# Patient Record
Sex: FEMALE | Race: WHITE | NOT HISPANIC OR LATINO | URBAN - METROPOLITAN AREA
[De-identification: names, ages, dates, MRNs, and addresses within clinical notes are randomized per-mention and may not be internally consistent; named-entity substitution may affect disease eponyms.]

---

## 2023-09-12 ENCOUNTER — HOSPITAL ENCOUNTER (OUTPATIENT)
Dept: DATA CONVERSION | Facility: HOSPITAL | Age: 33
Discharge: HOME | End: 2023-09-12
Payer: COMMERCIAL

## 2023-09-12 DIAGNOSIS — I10 ESSENTIAL (PRIMARY) HYPERTENSION: ICD-10-CM

## 2023-09-12 DIAGNOSIS — Z01.818 ENCOUNTER FOR OTHER PREPROCEDURAL EXAMINATION: ICD-10-CM

## 2023-09-12 LAB
ALBUMIN SERPL-MCNC: 4.3 GM/DL (ref 3.5–5)
ALBUMIN/GLOB SERPL: 1.2 RATIO (ref 1.5–3)
ALP BLD-CCNC: 62 U/L (ref 35–125)
ALT SERPL-CCNC: 29 U/L (ref 5–40)
ANION GAP SERPL CALCULATED.3IONS-SCNC: 14 MMOL/L (ref 0–19)
AST SERPL-CCNC: 24 U/L (ref 5–40)
BASOPHILS # BLD AUTO: 0.06 K/UL (ref 0–0.22)
BASOPHILS NFR BLD AUTO: 0.8 % (ref 0–1)
BILIRUB SERPL-MCNC: 0.2 MG/DL (ref 0.1–1.2)
BUN SERPL-MCNC: 13 MG/DL (ref 8–25)
BUN/CREAT SERPL: 16.3 RATIO (ref 8–21)
CALCIUM SERPL-MCNC: 9.2 MG/DL (ref 8.5–10.4)
CHLORIDE SERPL-SCNC: 99 MMOL/L (ref 97–107)
CO2 SERPL-SCNC: 26 MMOL/L (ref 24–31)
CREAT SERPL-MCNC: 0.8 MG/DL (ref 0.4–1.6)
DEPRECATED RDW RBC AUTO: 42.8 FL (ref 37–54)
DIFFERENTIAL METHOD BLD: NORMAL
EOSINOPHIL # BLD AUTO: 0.16 K/UL (ref 0–0.45)
EOSINOPHIL NFR BLD: 2.1 % (ref 0–3)
ERYTHROCYTE [DISTWIDTH] IN BLOOD BY AUTOMATED COUNT: 12.4 % (ref 11.7–15)
GFR SERPL CREATININE-BSD FRML MDRD: 100 ML/MIN/1.73 M2
GLOBULIN SER-MCNC: 3.6 G/DL (ref 1.9–3.7)
GLUCOSE SERPL-MCNC: 90 MG/DL (ref 65–99)
HCT VFR BLD AUTO: 41.2 % (ref 36–44)
HGB BLD-MCNC: 13.8 GM/DL (ref 12–15)
IMM GRANULOCYTES # BLD AUTO: 0.02 K/UL (ref 0–0.1)
LYMPHOCYTES # BLD AUTO: 2.63 K/UL (ref 1.2–3.2)
LYMPHOCYTES NFR BLD MANUAL: 34.1 % (ref 20–40)
MCH RBC QN AUTO: 31.3 PG (ref 26–34)
MCHC RBC AUTO-ENTMCNC: 33.5 % (ref 31–37)
MCV RBC AUTO: 93.4 FL (ref 80–100)
MONOCYTES # BLD AUTO: 0.53 K/UL (ref 0–0.8)
MONOCYTES NFR BLD MANUAL: 6.9 % (ref 0–8)
NEUTROPHILS # BLD AUTO: 4.31 K/UL
NEUTROPHILS # BLD AUTO: 4.31 K/UL (ref 1.8–7.7)
NEUTROPHILS.IMMATURE NFR BLD: 0.3 % (ref 0–1)
NEUTS SEG NFR BLD: 55.8 % (ref 50–70)
NRBC BLD-RTO: 0 /100 WBC
PLATELET # BLD AUTO: 289 K/UL (ref 150–450)
PMV BLD AUTO: 8.9 CU (ref 7–12.6)
POTASSIUM SERPL-SCNC: 3.8 MMOL/L (ref 3.4–5.1)
PROT SERPL-MCNC: 7.9 G/DL (ref 5.9–7.9)
RBC # BLD AUTO: 4.41 M/UL (ref 4–4.9)
SODIUM SERPL-SCNC: 139 MMOL/L (ref 133–145)
WBC # BLD AUTO: 7.7 K/UL (ref 4.5–11)

## 2023-09-14 PROBLEM — N80.9 ENDOMETRIOSIS: Status: ACTIVE | Noted: 2023-09-14

## 2023-09-14 PROBLEM — I10 ACCELERATED ESSENTIAL HYPERTENSION: Status: ACTIVE | Noted: 2023-09-14

## 2023-09-14 PROBLEM — M92.529 JUVENILE OSTEOCHONDROSIS OF TIBIAL TUBEROSITY: Status: ACTIVE | Noted: 2023-09-14

## 2023-09-14 PROBLEM — R25.2 TRISMUS: Status: ACTIVE | Noted: 2023-09-14

## 2023-09-14 PROBLEM — F41.8 DEPRESSION WITH ANXIETY: Status: ACTIVE | Noted: 2023-09-14

## 2023-09-14 PROBLEM — G47.33 OBSTRUCTIVE SLEEP APNEA, ADULT: Status: ACTIVE | Noted: 2023-09-14

## 2023-09-14 PROBLEM — I10 HTN (HYPERTENSION): Status: ACTIVE | Noted: 2023-09-14

## 2023-09-14 PROBLEM — F41.0 PANIC ATTACKS: Status: ACTIVE | Noted: 2023-09-14

## 2023-09-14 PROBLEM — R00.2 HEART PALPITATIONS: Status: ACTIVE | Noted: 2023-09-14

## 2023-09-14 PROBLEM — N92.1 MENOMETRORRHAGIA: Status: ACTIVE | Noted: 2023-09-14

## 2023-09-14 PROBLEM — R35.0 URINARY FREQUENCY: Status: ACTIVE | Noted: 2023-09-14

## 2023-09-14 PROBLEM — R10.11 ABDOMINAL PAIN, RUQ (RIGHT UPPER QUADRANT): Status: ACTIVE | Noted: 2023-09-14

## 2023-09-14 PROBLEM — J34.2 DEVIATED NASAL SEPTUM: Status: ACTIVE | Noted: 2023-09-14

## 2023-09-14 PROBLEM — F17.200 SMOKING ADDICTION: Status: ACTIVE | Noted: 2023-09-14

## 2023-09-14 PROBLEM — N93.9 ABNORMAL VAGINAL BLEEDING: Status: ACTIVE | Noted: 2023-09-14

## 2023-09-14 PROBLEM — K21.9 GASTRO-ESOPHAGEAL REFLUX DISEASE WITHOUT ESOPHAGITIS: Status: ACTIVE | Noted: 2023-09-14

## 2023-09-14 PROBLEM — N12 PYELONEPHRITIS: Status: ACTIVE | Noted: 2023-09-14

## 2023-09-14 PROBLEM — K52.9 COLITIS: Status: ACTIVE | Noted: 2023-09-14

## 2023-09-14 PROBLEM — R40.0 DAYTIME SOMNOLENCE: Status: ACTIVE | Noted: 2023-09-14

## 2023-09-14 PROBLEM — E78.5 HYPERLIPIDEMIA: Status: ACTIVE | Noted: 2023-09-14

## 2023-09-14 PROBLEM — E66.01 MORBID OBESITY (MULTI): Status: ACTIVE | Noted: 2023-09-14

## 2023-09-14 PROBLEM — K81.9 ACALCULOUS CHOLECYSTITIS: Status: ACTIVE | Noted: 2023-09-14

## 2023-09-14 PROBLEM — E55.9 VITAMIN D DEFICIENCY: Status: ACTIVE | Noted: 2023-09-14

## 2023-09-14 RX ORDER — ASPIRIN 325 MG
50000 TABLET, DELAYED RELEASE (ENTERIC COATED) ORAL
COMMUNITY
Start: 2023-01-23

## 2023-09-14 RX ORDER — FLUTICASONE PROPIONATE 50 MCG
SPRAY, SUSPENSION (ML) NASAL
COMMUNITY

## 2023-09-14 RX ORDER — LISDEXAMFETAMINE DIMESYLATE 40 MG/1
CAPSULE ORAL
COMMUNITY
Start: 2023-08-30

## 2023-09-14 RX ORDER — FAMOTIDINE 20 MG/1
20 TABLET, FILM COATED ORAL 2 TIMES DAILY
COMMUNITY
Start: 2023-03-02

## 2023-09-14 RX ORDER — ARIPIPRAZOLE 2 MG/1
2 TABLET ORAL DAILY
COMMUNITY
End: 2023-11-16 | Stop reason: SDUPTHER

## 2023-09-14 RX ORDER — LISDEXAMFETAMINE DIMESYLATE 20 MG/1
CAPSULE ORAL
COMMUNITY
Start: 2023-04-14

## 2023-09-14 RX ORDER — ACETAMINOPHEN 325 MG/1
325 TABLET ORAL EVERY 4 HOURS
COMMUNITY

## 2023-09-14 RX ORDER — ONDANSETRON 4 MG/1
4 TABLET, ORALLY DISINTEGRATING ORAL EVERY 6 HOURS PRN
COMMUNITY
Start: 2023-03-02

## 2023-09-14 RX ORDER — BUPROPION HYDROCHLORIDE 300 MG/1
300 TABLET ORAL EVERY MORNING
COMMUNITY

## 2023-09-14 RX ORDER — CALCIUM CARB/MAGNESIUM CARB 311-232MG
1 TABLET ORAL DAILY
COMMUNITY

## 2023-09-14 RX ORDER — BUPROPION HYDROCHLORIDE 150 MG/1
450 TABLET ORAL EVERY MORNING
COMMUNITY

## 2023-09-14 RX ORDER — DICYCLOMINE HYDROCHLORIDE 20 MG/1
20 TABLET ORAL EVERY 6 HOURS PRN
COMMUNITY
Start: 2023-03-02

## 2023-09-14 RX ORDER — MULTIVITAMIN
TABLET ORAL
COMMUNITY

## 2023-09-14 RX ORDER — ESCITALOPRAM OXALATE 20 MG/1
20 TABLET ORAL DAILY
COMMUNITY
End: 2024-01-10

## 2023-09-14 RX ORDER — AMOXICILLIN 500 MG/1
500 CAPSULE ORAL EVERY 12 HOURS
COMMUNITY
Start: 2019-06-27

## 2023-09-14 RX ORDER — LEVONORGESTREL/ETHIN.ESTRADIOL 0.1-0.02MG
1 TABLET ORAL DAILY
COMMUNITY
Start: 2021-04-14

## 2023-10-09 ENCOUNTER — OFFICE VISIT (OUTPATIENT)
Dept: SURGERY | Facility: CLINIC | Age: 33
End: 2023-10-09
Payer: COMMERCIAL

## 2023-10-09 ENCOUNTER — NUTRITION (OUTPATIENT)
Dept: SURGERY | Facility: CLINIC | Age: 33
End: 2023-10-09

## 2023-10-09 VITALS
HEIGHT: 67 IN | HEART RATE: 101 BPM | BODY MASS INDEX: 45.99 KG/M2 | DIASTOLIC BLOOD PRESSURE: 99 MMHG | SYSTOLIC BLOOD PRESSURE: 153 MMHG | WEIGHT: 293 LBS

## 2023-10-09 DIAGNOSIS — Z09 SURGERY FOLLOW-UP EXAMINATION: Primary | ICD-10-CM

## 2023-10-09 PROCEDURE — 99024 POSTOP FOLLOW-UP VISIT: CPT

## 2023-10-09 PROCEDURE — 3077F SYST BP >= 140 MM HG: CPT

## 2023-10-09 PROCEDURE — 1036F TOBACCO NON-USER: CPT

## 2023-10-09 PROCEDURE — 3080F DIAST BP >= 90 MM HG: CPT

## 2023-10-09 ASSESSMENT — LIFESTYLE VARIABLES
HOW OFTEN DO YOU HAVE A DRINK CONTAINING ALCOHOL: NEVER
HOW OFTEN DO YOU HAVE SIX OR MORE DRINKS ON ONE OCCASION: NEVER
SKIP TO QUESTIONS 9-10: 1
AUDIT-C TOTAL SCORE: 0
HOW MANY STANDARD DRINKS CONTAINING ALCOHOL DO YOU HAVE ON A TYPICAL DAY: PATIENT DOES NOT DRINK

## 2023-10-09 ASSESSMENT — PATIENT HEALTH QUESTIONNAIRE - PHQ9
1. LITTLE INTEREST OR PLEASURE IN DOING THINGS: NOT AT ALL
SUM OF ALL RESPONSES TO PHQ9 QUESTIONS 1 AND 2: 0
2. FEELING DOWN, DEPRESSED OR HOPELESS: NOT AT ALL

## 2023-10-09 ASSESSMENT — ENCOUNTER SYMPTOMS
LOSS OF SENSATION IN FEET: 0
OCCASIONAL FEELINGS OF UNSTEADINESS: 0
DEPRESSION: 0

## 2023-10-09 ASSESSMENT — COLUMBIA-SUICIDE SEVERITY RATING SCALE - C-SSRS
2. HAVE YOU ACTUALLY HAD ANY THOUGHTS OF KILLING YOURSELF?: NO
1. IN THE PAST MONTH, HAVE YOU WISHED YOU WERE DEAD OR WISHED YOU COULD GO TO SLEEP AND NOT WAKE UP?: NO
6. HAVE YOU EVER DONE ANYTHING, STARTED TO DO ANYTHING, OR PREPARED TO DO ANYTHING TO END YOUR LIFE?: NO

## 2023-10-09 ASSESSMENT — PAIN SCALES - GENERAL: PAINLEVEL: 0-NO PAIN

## 2023-10-09 NOTE — PROGRESS NOTES
2 week post-op Dietary Appointment :     Reviewed pureed diet progression. Patient was instructed to start purees on Tuesday 10/10. I reviewed how to blend foods appropriately to one consistency using a . Pureed food examples and recipes were provided in packet, reviewed foods to avoid at this time. Informed patient to measure out portion sizes and track volume size. Informed patient to aim for at least 60g of protein per day and to add protein shake as needed. Encouraged continuing to prioritize fluids and ensure getting in at least 50-60 oz fluid daily. Reviewed the importance of slowly eating and stop when starting to feel full. Patient was instructed to follow pureed diet for a full 2 weeks, and to not progress diet until instructed to do so. Pureed education packet was given, patient shows good understanding.     Simi Mackay, MS, RD, LD

## 2023-10-09 NOTE — PROGRESS NOTES
"Subjective   Patient ID: Alethea Gannon is a 33 y.o. female who presents for Post-op (2 wk fuv rygb).    Alethea is here for her 2 week follow up. She is drinking 50-60oz of fluid per day. She is getting 50-60g of protein per day. She is walking every hour. She is currently taking celebrate 18 MVI, omeprazole, and lovenox. She has 2 weeks of lovenox left. She does not have a gallbladder.    Visit Vitals  BP (!) 153/99 (BP Location: Left arm, Patient Position: Sitting)   Pulse 101   Ht 1.702 m (5' 7\")   Wt 140 kg (309 lb) Comment: START WT:       IDEAL WT:         START EXCESS:           TOTAL WT LOSS:           EWL:              %   BMI 48.40 kg/m²   Smoking Status Never   BSA 2.57 m²    Body mass index is 48.4 kg/m². -21 pounds    START WT:   342    IDEAL WT: 160    START EXCESS: 182       TOTAL WT LOSS:  33       EWL: 18 %         Objective   Physical Exam  Constitutional:       Appearance: Normal appearance.   Eyes:      Pupils: Pupils are equal, round, and reactive to light.   Cardiovascular:      Rate and Rhythm: Normal rate.   Pulmonary:      Effort: Pulmonary effort is normal.   Abdominal:      Palpations: Abdomen is soft.      Comments: Incisions closed   Musculoskeletal:         General: Normal range of motion.   Skin:     General: Skin is warm and dry.   Neurological:      General: No focal deficit present.      Mental Status: She is alert and oriented to person, place, and time.   Psychiatric:         Mood and Affect: Mood normal.         Assessment/Plan   Problem List Items Addressed This Visit             ICD-10-CM    Surgery follow-up examination - Primary Z09   Continue activity restrictions. Continue to crush pills. May face shower. Advance diet per protocol. Continue MVI with Fe, PPI. Reviewed the \"rules\" for long term weight loss success. Continue to increase walking for exercise.                 "

## 2023-10-10 PROBLEM — Z09 SURGERY FOLLOW-UP EXAMINATION: Status: ACTIVE | Noted: 2023-10-10

## 2023-10-10 RX ORDER — BUPROPION HYDROCHLORIDE 75 MG/1
75 TABLET ORAL DAILY
COMMUNITY

## 2023-10-20 NOTE — PROGRESS NOTES
"Subjective   Patient ID: Alethea Gannon is a 33 y.o. female who presents for 4 week follow up after a RYGB.  HPI     General Comments:          Do you have any difficulties with:  swallowing?  No, nausea?  No, vomiting?  No, pain?  No, heartburn?  No, discomfort?  No, intolerances?  No. Fluid and Protein Intake:  Reviewed fluid and protein log:  Yes, Fluid intake (ounces):   Sources:, Protein intake (grams):   Sources:. Daily Diet Recall: ----. Volume of food at a time: pureed. Chewable MVI:  Taking as directed?  Yes. PPI/omeprazole:  Taking as directed/prescribed?  Yes. Walking:  Every hour?  Yes. Ursodiol:  Taking as directed/prescribed?  Yes.      -Bariatric Follow-up:          Receive IV fluids  No. Seen in ER with admission No. Seen ER without admission No. Hospital readmission No.      With patient's permission, this is a Telemedicine visit with audio. Alethea is 4 weeks post op after a RYGB. She denies reflux. She is drinking over 50oz of fluid per day. She is getting 50-60g of protein per day. She is taking a long walking every hour. She is currently taking celebrate 18 MVI and omeprazole.     Visit Vitals  BP (!) 167/102 (BP Location: Left arm, Patient Position: Sitting)   Pulse (!) 112   Wt 134 kg (296 lb)   BMI 46.36 kg/m²   Smoking Status Never   BSA 2.52 m²    Body mass index is 46.36 kg/m².        Objective   Physical Exam  No exam due to televisit with provider.     Assessment/Plan   Problem List Items Addressed This Visit             ICD-10-CM    Surgery follow-up examination - Primary Z09    Relevant Medications    omeprazole (PriLOSEC) 40 mg DR capsule   Continue activity restrictions. Continue to crush pills. Advance diet per protocol. Continue MVI with Fe, PPI. Reviewed the \"rules\" for long term weight loss success. Continue to increase walking for exercise.       "

## 2023-10-23 ENCOUNTER — OFFICE VISIT (OUTPATIENT)
Dept: SURGERY | Facility: CLINIC | Age: 33
End: 2023-10-23
Payer: COMMERCIAL

## 2023-10-23 ENCOUNTER — NUTRITION (OUTPATIENT)
Dept: SURGERY | Facility: CLINIC | Age: 33
End: 2023-10-23

## 2023-10-23 VITALS
DIASTOLIC BLOOD PRESSURE: 102 MMHG | WEIGHT: 293 LBS | BODY MASS INDEX: 46.36 KG/M2 | HEART RATE: 112 BPM | SYSTOLIC BLOOD PRESSURE: 167 MMHG

## 2023-10-23 VITALS — BODY MASS INDEX: 46.36 KG/M2 | WEIGHT: 293 LBS

## 2023-10-23 DIAGNOSIS — Z09 SURGERY FOLLOW-UP EXAMINATION: Primary | ICD-10-CM

## 2023-10-23 PROCEDURE — 99024 POSTOP FOLLOW-UP VISIT: CPT

## 2023-10-23 PROCEDURE — 1036F TOBACCO NON-USER: CPT

## 2023-10-23 PROCEDURE — 3080F DIAST BP >= 90 MM HG: CPT

## 2023-10-23 PROCEDURE — 3077F SYST BP >= 140 MM HG: CPT

## 2023-10-23 RX ORDER — OMEPRAZOLE 40 MG/1
40 CAPSULE, DELAYED RELEASE ORAL DAILY
Qty: 30 CAPSULE | Refills: 1 | Status: SHIPPED | OUTPATIENT
Start: 2023-10-23 | End: 2024-04-25

## 2023-10-23 NOTE — PROGRESS NOTES
4 week Post-op Appointment - Dietary Follow Up     Current Weight: 296 lb  Current BMI: 46.36    Pt reports experiencing nausea/vomiting after trying pureed ricotta bake recipe. Pt reports she called the office and spoke to Dr. Brink to make him aware. Pt reports she has avoided that recipe since and has not experienced any more symptoms.     Reviewed soft food diet progression. Patient was instructed to start soft food stage 1 on Tuesday, 10/24. I reviewed which foods patient can start to incorporate and which foods to avoid. Instructed to start soft food stage 2 on Tuesday, 10/31. Informed patient to measure out portion sizes and track volume size. Informed patient to aim for at least 60g of protein per day and to add protein shake as needed. Encouraged continuing to prioritize fluids and ensure getting in at least 50-60 oz fluid daily. Reviewed the importance of slowly eating and stop when starting to feel full. Patient was instructed to follow the soft diet for a full 2 weeks, and to not progress diet until instructed to do so. Soft food education packet was given, patient shows good understanding.         Lima Harvey RD, LDN  Registered Dietitian, Licensed Dietitian Nutritionist

## 2023-11-06 ENCOUNTER — OFFICE VISIT (OUTPATIENT)
Dept: SURGERY | Facility: CLINIC | Age: 33
End: 2023-11-06
Payer: COMMERCIAL

## 2023-11-06 ENCOUNTER — NUTRITION (OUTPATIENT)
Dept: SURGERY | Facility: CLINIC | Age: 33
End: 2023-11-06

## 2023-11-06 VITALS
SYSTOLIC BLOOD PRESSURE: 134 MMHG | DIASTOLIC BLOOD PRESSURE: 84 MMHG | HEART RATE: 99 BPM | HEIGHT: 67 IN | WEIGHT: 290 LBS | BODY MASS INDEX: 45.52 KG/M2

## 2023-11-06 DIAGNOSIS — E55.9 VITAMIN D DEFICIENCY: ICD-10-CM

## 2023-11-06 DIAGNOSIS — K90.9 INTESTINAL MALABSORPTION, UNSPECIFIED TYPE (HHS-HCC): ICD-10-CM

## 2023-11-06 DIAGNOSIS — E53.8 VITAMIN B12 DEFICIENCY: ICD-10-CM

## 2023-11-06 DIAGNOSIS — Z09 SURGERY FOLLOW-UP EXAMINATION: Primary | ICD-10-CM

## 2023-11-06 DIAGNOSIS — E21.3 HYPERPARATHYROIDISM (MULTI): ICD-10-CM

## 2023-11-06 PROCEDURE — 1036F TOBACCO NON-USER: CPT

## 2023-11-06 PROCEDURE — 3075F SYST BP GE 130 - 139MM HG: CPT

## 2023-11-06 PROCEDURE — 99024 POSTOP FOLLOW-UP VISIT: CPT

## 2023-11-06 PROCEDURE — 3079F DIAST BP 80-89 MM HG: CPT

## 2023-11-06 RX ORDER — DEXTROAMPHETAMINE SACCHARATE, AMPHETAMINE ASPARTATE, DEXTROAMPHETAMINE SULFATE AND AMPHETAMINE SULFATE 3.75; 3.75; 3.75; 3.75 MG/1; MG/1; MG/1; MG/1
15 TABLET ORAL DAILY
COMMUNITY

## 2023-11-06 ASSESSMENT — PAIN SCALES - GENERAL: PAINLEVEL: 0-NO PAIN

## 2023-11-06 NOTE — PROGRESS NOTES
"Subjective   Patient ID: Alethea Gannon is a 33 y.o. female who presents for 6 week follow up.     HPI  Alethea is 6 weeks post op after a RYGB. She denies reflux. She is drinking over 50oz of fluid per day. She is getting 50-60g of protein per day. She is taking a long walk every day. She is currently taking celebrate 18 MVI and omeprazole.      Visit Vitals  /84 (BP Location: Left arm, Patient Position: Sitting)   Pulse 99   Ht 1.702 m (5' 7\")   Wt 132 kg (290 lb) Comment: START WT:342 IDEAL WT:160 START EXCESS: 182 TOTAL WT LOSS: 52         EWL: 29 % ht: 67 inches   BMI 45.42 kg/m²   Smoking Status Never   BSA 2.5 m²       Objective   Physical Exam  Constitutional:       Appearance: Normal appearance.   Eyes:      Pupils: Pupils are equal, round, and reactive to light.   Cardiovascular:      Rate and Rhythm: Normal rate.   Pulmonary:      Effort: Pulmonary effort is normal.   Abdominal:      Palpations: Abdomen is soft.   Musculoskeletal:         General: Normal range of motion.   Skin:     General: Skin is warm and dry.   Neurological:      General: No focal deficit present.      Mental Status: She is alert and oriented to person, place, and time.   Psychiatric:         Mood and Affect: Mood normal.         Assessment/Plan   Problem List Items Addressed This Visit             ICD-10-CM    Vitamin D deficiency E55.9    Relevant Orders    Vitamin D 25-Hydroxy,Total (for eval of Vitamin D levels)    Surgery follow-up examination - Primary Z09    Vitamin B12 deficiency E53.8    Relevant Orders    Vitamin B12    Intestinal malabsorption K90.9    Relevant Orders    CBC and Auto Differential    Comprehensive Metabolic Panel    Copper, Blood    Ferritin    Folate    Iron and TIBC    Parathyroid Hormone, Intact    Vitamin B1, Whole Blood    Vitamin B12    Vitamin D 25-Hydroxy,Total (for eval of Vitamin D levels)    Zinc, Serum or Plasma    Hyperparathyroidism (CMS/HCC) E21.3    Relevant Orders    Parathyroid " "Hormone, Intact     Start calcium citrate BID,  from multivitamin by at least 2 hours. Activity restrictions lifted. May swallow pills. Advance diet per protocol. Continue MVI with Fe, PPI. Reviewed the \"rules\" for long term weight loss success. Continue to increase walking for exercise.     "

## 2023-11-06 NOTE — PROGRESS NOTES
"6 WK FUV RYGB  START WT:342 IDEAL WT:160 START EXCESS: 182 TOTAL WT LOSS: 52         EWL: 29 % ht: 67 inches  Chief Complaints:         1. PBS:6 wk f/u.          HPI:     General Comments:          Do you have any difficulties with:  swallowing?  No, nausea?  No, vomiting?  No, pain?  No, heartburn?  No, discomfort?  No, intolerances?  No. Fluid and Protein Intake:  Reviewed fluid and protein log:  Yes, Fluid intake (ounces): 50  Sources:, Protein intake (grams):   60 Sources:. Daily Diet Recall: ----. Volume of food at a time: soft. Chewable MVI:  Taking as directed?  Yes. PPI/omeprazole:  Taking as directed/prescribed?  Yes. Walking:  Every hour?  Yes. Ursodiol:  Taking as directed/prescribed?  Yes.      -Bariatric Follow-up:          Receive IV fluids  No. Seen in ER with admission No. Seen ER without admission No. Hospital readmission No.           Medical History:          Objective:       Assessment:        Assessment:    1. Surgery follow-up examination - Z09   2. H/O bariatric surgery - Z98.84   3. Abnormal intestinal absorption - K90.9   4. Vitamin D deficiency, unspecified - E55.9   5. B12 deficiency - E53.8   6. Hyperparathyroidism - E21.3      Plan:        1. Others    Notes:  Advance diet per protocol  May swallow whole pills  Reviewed the \"rules\" for long-term weight loss success  Continue PPI, MVI with Fe  Begin micronutrient supplement protocol/handout given  Activity restrictions lifted  Discussed the importance of regular exercise for continued long-term weight loss success  .              Preventive Medicine:      Counseling:  Medication education:  Education:  All new and/or current medications discussed and reviewed including side effects with patient/caregiver, Understanding:  Caregiver/Patient expressed understanding., Adherence:  Barriers to adherence identified and discussed if present. BMI Care goal follow up plan:  Above normal BMI management provided  Dietary management education, " guidance, and counseling.           Follow Up: 6 Weeks

## 2023-11-06 NOTE — PROGRESS NOTES
6 week Post-op Appointment - Dietary Follow Up: RYGB    Current Weight: 290 lb  Current BMI: 45.42  Percent EWL: 29%    Reviewed 6 week diet progression. I reviewed which foods patient can start to incorporate and which foods to avoid. I reviewed timeline with patient over the next 6 weeks on which foods can be reintroduced back into the diet. Informed patient to measure out portion sizes and track volume size. Informed patient to aim for at least 60g of protein per day and continue to prioritize fluids and ensure getting in at least 50-60 oz fluid daily. Reviewed the importance of slowly eating and stop when starting to feel full. 6 week diet education packet was given. Reviewed Vitamin and Mineral supplementation to start taking at 6 weeks post-op in addition to daily MVI. Patient shows good understanding.         Lima Harvey RD, LDN  Registered Dietitian, Licensed Dietitian Nutritionist

## 2023-11-16 DIAGNOSIS — F41.8 DEPRESSION WITH ANXIETY: Primary | ICD-10-CM

## 2023-11-16 RX ORDER — ARIPIPRAZOLE 2 MG/1
2 TABLET ORAL DAILY
Qty: 90 TABLET | Refills: 1 | Status: SHIPPED | OUTPATIENT
Start: 2023-11-16 | End: 2024-11-15

## 2024-01-09 NOTE — PROGRESS NOTES
3 MOS FUV RYGB  START WT: 342      IDEAL WT: 160  START EXCESS:   182  TOTAL WT LOSS:  87 EWL:  48  % HT: 67 IN  Bariatric Surgery F/U:          Seen at ER after surgery? No.  Hospital admission? No.  Bariatric reoperation? No.  Bariatric intervention? No.  Was anticoagulation initiated for presumed/confirmed Vein Thrombosis/PE? No.  Was an incisional hernia noted on exam? No.  Sleep Apnea? yes.  Still using C-PAP? yes.  GERD req. meds? takes meds doesn't have acid reflux Hyperlipidemia? No.  Still taking Cholesterol med? No.  Hypertension? No.  Still taking HTN med? No.  Number of Anti-hypertensive Medications: 0.  Diabetes? No.  Still taking DM med? No.  Current DM medication type: _____.         CONSTITUTIONAL:          Chills No.  Fatigue No.  Fever No.         CARDIOLOGY:          Negative for dizziness, chest pain, palpitations, shortness of breath.         RESPIRATORY:          Negative for chest congestion, cough, wheezing.         GASTROENTEROLOGY:          Food Intolerance No.  Acid reflux No.  Abdominal pain No.  Black stools No.  Constipation No.  Diarrhea No.  Loss of appetite no.  Nausea No.  Vomiting No.         UROLOGY:          Negative for dysuria, urinary urgency, kidney stones.         PSYCHOLOGY:          Negative for depression, anxiety, high stress level.

## 2024-01-10 ENCOUNTER — LAB (OUTPATIENT)
Dept: LAB | Facility: LAB | Age: 34
End: 2024-01-10
Payer: COMMERCIAL

## 2024-01-10 DIAGNOSIS — K90.9 INTESTINAL MALABSORPTION, UNSPECIFIED TYPE (HHS-HCC): ICD-10-CM

## 2024-01-10 DIAGNOSIS — E53.8 VITAMIN B12 DEFICIENCY: ICD-10-CM

## 2024-01-10 DIAGNOSIS — E21.3 HYPERPARATHYROIDISM (MULTI): ICD-10-CM

## 2024-01-10 DIAGNOSIS — F41.0 PANIC ATTACKS: Primary | ICD-10-CM

## 2024-01-10 DIAGNOSIS — E55.9 VITAMIN D DEFICIENCY: ICD-10-CM

## 2024-01-10 LAB
25(OH)D3 SERPL-MCNC: 35 NG/ML (ref 31–100)
ALBUMIN SERPL-MCNC: 4.9 G/DL (ref 3.5–5)
ALP BLD-CCNC: 93 U/L (ref 35–125)
ALT SERPL-CCNC: 39 U/L (ref 5–40)
ANION GAP SERPL CALC-SCNC: 17 MMOL/L
AST SERPL-CCNC: 30 U/L (ref 5–40)
BASOPHILS # BLD AUTO: 0.05 X10*3/UL (ref 0–0.1)
BASOPHILS NFR BLD AUTO: 0.7 %
BILIRUB SERPL-MCNC: 0.4 MG/DL (ref 0.1–1.2)
BUN SERPL-MCNC: 10 MG/DL (ref 8–25)
CALCIUM SERPL-MCNC: 9.9 MG/DL (ref 8.5–10.4)
CHLORIDE SERPL-SCNC: 100 MMOL/L (ref 97–107)
CO2 SERPL-SCNC: 23 MMOL/L (ref 24–31)
CREAT SERPL-MCNC: 0.8 MG/DL (ref 0.4–1.6)
EGFRCR SERPLBLD CKD-EPI 2021: >90 ML/MIN/1.73M*2
EOSINOPHIL # BLD AUTO: 0.15 X10*3/UL (ref 0–0.7)
EOSINOPHIL NFR BLD AUTO: 2.1 %
ERYTHROCYTE [DISTWIDTH] IN BLOOD BY AUTOMATED COUNT: 13.4 % (ref 11.5–14.5)
FERRITIN SERPL-MCNC: 185 NG/ML (ref 13–150)
FOLATE SERPL-MCNC: >20 NG/ML (ref 4.2–19.9)
GLUCOSE SERPL-MCNC: 101 MG/DL (ref 65–99)
HCT VFR BLD AUTO: 43.2 % (ref 36–46)
HGB BLD-MCNC: 14.3 G/DL (ref 12–16)
IMM GRANULOCYTES # BLD AUTO: 0.01 X10*3/UL (ref 0–0.7)
IMM GRANULOCYTES NFR BLD AUTO: 0.1 % (ref 0–0.9)
IRON SATN MFR SERPL: 37 % (ref 12–50)
IRON SERPL-MCNC: 113 UG/DL (ref 30–160)
LYMPHOCYTES # BLD AUTO: 3.02 X10*3/UL (ref 1.2–4.8)
LYMPHOCYTES NFR BLD AUTO: 43.1 %
MCH RBC QN AUTO: 31 PG (ref 26–34)
MCHC RBC AUTO-ENTMCNC: 33.1 G/DL (ref 32–36)
MCV RBC AUTO: 94 FL (ref 80–100)
MONOCYTES # BLD AUTO: 0.46 X10*3/UL (ref 0.1–1)
MONOCYTES NFR BLD AUTO: 6.6 %
NEUTROPHILS # BLD AUTO: 3.31 X10*3/UL (ref 1.2–7.7)
NEUTROPHILS NFR BLD AUTO: 47.4 %
NRBC BLD-RTO: 0 /100 WBCS (ref 0–0)
PLATELET # BLD AUTO: 274 X10*3/UL (ref 150–450)
POTASSIUM SERPL-SCNC: 3.6 MMOL/L (ref 3.4–5.1)
PROT SERPL-MCNC: 7.8 G/DL (ref 5.9–7.9)
PTH-INTACT SERPL-MCNC: 32.6 PG/ML (ref 18.5–88)
RBC # BLD AUTO: 4.61 X10*6/UL (ref 4–5.2)
SODIUM SERPL-SCNC: 140 MMOL/L (ref 133–145)
TIBC SERPL-MCNC: 303 UG/DL (ref 228–428)
UIBC SERPL-MCNC: 190 UG/DL (ref 110–370)
VIT B12 SERPL-MCNC: 1883 PG/ML (ref 211–946)
WBC # BLD AUTO: 7 X10*3/UL (ref 4.4–11.3)

## 2024-01-10 PROCEDURE — 82746 ASSAY OF FOLIC ACID SERUM: CPT

## 2024-01-10 PROCEDURE — 83970 ASSAY OF PARATHORMONE: CPT

## 2024-01-10 PROCEDURE — 85025 COMPLETE CBC W/AUTO DIFF WBC: CPT

## 2024-01-10 PROCEDURE — 82728 ASSAY OF FERRITIN: CPT

## 2024-01-10 PROCEDURE — 84425 ASSAY OF VITAMIN B-1: CPT

## 2024-01-10 PROCEDURE — 80053 COMPREHEN METABOLIC PANEL: CPT

## 2024-01-10 PROCEDURE — 82607 VITAMIN B-12: CPT

## 2024-01-10 PROCEDURE — 36415 COLL VENOUS BLD VENIPUNCTURE: CPT

## 2024-01-10 PROCEDURE — 83550 IRON BINDING TEST: CPT

## 2024-01-10 PROCEDURE — 82525 ASSAY OF COPPER: CPT

## 2024-01-10 PROCEDURE — 83540 ASSAY OF IRON: CPT

## 2024-01-10 PROCEDURE — 82306 VITAMIN D 25 HYDROXY: CPT

## 2024-01-10 PROCEDURE — 84630 ASSAY OF ZINC: CPT

## 2024-01-10 RX ORDER — ESCITALOPRAM OXALATE 20 MG/1
20 TABLET ORAL DAILY
Qty: 30 TABLET | Refills: 0 | Status: SHIPPED | OUTPATIENT
Start: 2024-01-10 | End: 2024-02-25

## 2024-01-11 ENCOUNTER — OFFICE VISIT (OUTPATIENT)
Dept: SURGERY | Facility: CLINIC | Age: 34
End: 2024-01-11
Payer: COMMERCIAL

## 2024-01-11 ENCOUNTER — NUTRITION (OUTPATIENT)
Dept: SURGERY | Facility: CLINIC | Age: 34
End: 2024-01-11

## 2024-01-11 VITALS
BODY MASS INDEX: 40.02 KG/M2 | HEART RATE: 93 BPM | SYSTOLIC BLOOD PRESSURE: 160 MMHG | DIASTOLIC BLOOD PRESSURE: 80 MMHG | HEIGHT: 67 IN | WEIGHT: 255 LBS

## 2024-01-11 DIAGNOSIS — E21.3 HYPERPARATHYROIDISM (MULTI): ICD-10-CM

## 2024-01-11 DIAGNOSIS — K90.9 INTESTINAL MALABSORPTION, UNSPECIFIED TYPE (HHS-HCC): Primary | ICD-10-CM

## 2024-01-11 DIAGNOSIS — Z98.84 HISTORY OF GASTRIC BYPASS: ICD-10-CM

## 2024-01-11 DIAGNOSIS — E55.9 VITAMIN D DEFICIENCY: ICD-10-CM

## 2024-01-11 DIAGNOSIS — E53.8 VITAMIN B12 DEFICIENCY: ICD-10-CM

## 2024-01-11 PROCEDURE — 99214 OFFICE O/P EST MOD 30 MIN: CPT | Performed by: SURGERY

## 2024-01-11 PROCEDURE — 1036F TOBACCO NON-USER: CPT | Performed by: SURGERY

## 2024-01-11 PROCEDURE — 3008F BODY MASS INDEX DOCD: CPT | Performed by: SURGERY

## 2024-01-11 PROCEDURE — 3077F SYST BP >= 140 MM HG: CPT | Performed by: SURGERY

## 2024-01-11 PROCEDURE — 3079F DIAST BP 80-89 MM HG: CPT | Performed by: SURGERY

## 2024-01-11 RX ORDER — IBUPROFEN 200 MG
1 CAPSULE ORAL 2 TIMES DAILY
COMMUNITY

## 2024-01-11 ASSESSMENT — PAIN SCALES - GENERAL: PAINLEVEL: 0-NO PAIN

## 2024-01-11 NOTE — H&P
History Of Present Illness  Alethea Gannon is a 33 y.o. woman is here for 3 months s/p RYGB.  She is on ppi.  She has no reflux and was told to stop her ppi.  She does not have a gallbladder.  She has an appetite.  Her meal volume is 1/2 cup to a cup depending on texture.  She takes an mvi (celebrate 18), calcium citrate bid.  She had labwork for this visit yesterday.  All the labs that are back are normal.  She is excercising 4 days per week for 20-30 minutes.       Past Medical History  Past Medical History:   Diagnosis Date    Anxiety     Depression     Endometriosis, unspecified     Endometriosis    Essential (primary) hypertension     Benign essential hypertension    HTN (hypertension)     Juvenile osteochondrosis of tibia tubercle, unspecified leg     Schlatter-Osgood disease    Personal history of other diseases of the circulatory system     History of hypertension    Sleep apnea        Surgical History  Past Surgical History:   Procedure Laterality Date    GASTRIC BYPASS  09/25/2023    LAPAROSCOPIC CAMILA-EN-Y GASTRIC BYPASS (JENELLE-YOHANA) SD    HYSTERECTOMY      LAPAROSCOPY FOR DUB    OTHER SURGICAL HISTORY  06/21/2021    Cholecystectomy        Social History  She reports that she has never smoked. She has never used smokeless tobacco. She reports that she does not drink alcohol and does not use drugs.    Family History  Family History   Problem Relation Name Age of Onset    Cardiomyopathy Mother      Heart attack Mother      Hypertension Mother      Other (morbid obese) Mother      Diabetes Mother      Hypertension Father      No Known Problems Sister      Hypertension Other grandfather     Diabetes Other grandmother     Arthritis Other grandmother     Heart disease Other grandmother         Allergies  Patient has no known allergies.    Review of Systems   Bariatric Surgery F/U:          Seen at ER after surgery? No.  Hospital admission? No.  Bariatric reoperation? No.  Bariatric intervention? No.  Was  "anticoagulation initiated for presumed/confirmed Vein Thrombosis/PE? No.  Was an incisional hernia noted on exam? No.  Sleep Apnea? yes.  Still using C-PAP? yes.  GERD req. meds? takes meds doesn't have acid reflux Hyperlipidemia? No.  Still taking Cholesterol med? No.  Hypertension? No.  Still taking HTN med? No.  Number of Anti-hypertensive Medications: 0.  Diabetes? No.  Still taking DM med? No.  Current DM medication type: _____.         CONSTITUTIONAL:          Chills No.  Fatigue No.  Fever No.         CARDIOLOGY:          Negative for dizziness, chest pain, palpitations, shortness of breath.         RESPIRATORY:          Negative for chest congestion, cough, wheezing.         GASTROENTEROLOGY:          Food Intolerance No.  Acid reflux No.  Abdominal pain No.  Black stools No.  Constipation No.  Diarrhea No.  Loss of appetite no.  Nausea No.  Vomiting No.         UROLOGY:          Negative for dysuria, urinary urgency, kidney stones.         PSYCHOLOGY:          Negative for depression, anxiety, high stress level.         Last Recorded Vitals  Pulse 110, height 1.702 m (5' 7\"), weight 116 kg (255 lb).    Relevant Results        Latest Reference Range & Units 01/10/24 09:17   GLUCOSE 65 - 99 mg/dL 101 (H)   SODIUM 133 - 145 mmol/L 140   POTASSIUM 3.4 - 5.1 mmol/L 3.6   CHLORIDE 97 - 107 mmol/L 100   Bicarbonate 24 - 31 mmol/L 23 (L)   Anion Gap <=19 mmol/L 17   Blood Urea Nitrogen 8 - 25 mg/dL 10   Creatinine 0.40 - 1.60 mg/dL 0.80   EGFR >60 mL/min/1.73m*2 >90   Calcium 8.5 - 10.4 mg/dL 9.9   Albumin 3.5 - 5.0 g/dL 4.9   Alkaline Phosphatase 35 - 125 U/L 93   ALT 5 - 40 U/L 39   AST 5 - 40 U/L 30   Bilirubin Total 0.1 - 1.2 mg/dL 0.4   FERRITIN 13 - 150 ng/mL 185 (H)   FOLATE 4.2 - 19.9 ng/mL >20.0 (H)   Total Protein 5.9 - 7.9 g/dL 7.8   IRON 30 - 160 ug/dL 113   TIBC 228 - 428 ug/dL 303   UIBC 110 - 370 ug/dL 190   % Saturation 12 - 50 % 37   Vitamin B12 211 - 946 pg/mL 1,883 (H)   Copper 80.0 - 155.0 ug/dL " 130.5   Zinc, Serum or Plasma 60.0 - 120.0 ug/dL 96.9   Vitamin B1, Whole Blood 70 - 180 nmol/L 126   Parathyroid Hormone, Intact 18.5 - 88.0 pg/mL 32.6   Vitamin D, 25-Hydroxy, Total 31 - 100 ng/mL 35   WBC 4.4 - 11.3 x10*3/uL 7.0   nRBC 0.0 - 0.0 /100 WBCs 0.0   RBC 4.00 - 5.20 x10*6/uL 4.61   HEMOGLOBIN 12.0 - 16.0 g/dL 14.3   HEMATOCRIT 36.0 - 46.0 % 43.2   MCV 80 - 100 fL 94   MCH 26.0 - 34.0 pg 31.0   MCHC 32.0 - 36.0 g/dL 33.1   RED CELL DISTRIBUTION WIDTH 11.5 - 14.5 % 13.4   Platelets 150 - 450 x10*3/uL 274   Neutrophils % 40.0 - 80.0 % 47.4   Immature Granulocytes %, Automated 0.0 - 0.9 % 0.1   Lymphocytes % 13.0 - 44.0 % 43.1   Monocytes % 2.0 - 10.0 % 6.6   Eosinophils % 0.0 - 6.0 % 2.1   Basophils % 0.0 - 2.0 % 0.7   Neutrophils Absolute 1.20 - 7.70 x10*3/uL 3.31   Immature Granulocytes Absolute, Automated 0.00 - 0.70 x10*3/uL 0.01   Lymphocytes Absolute 1.20 - 4.80 x10*3/uL 3.02   Monocytes Absolute 0.10 - 1.00 x10*3/uL 0.46   Eosinophils Absolute 0.00 - 0.70 x10*3/uL 0.15   Basophils Absolute 0.00 - 0.10 x10*3/uL 0.05   (H): Data is abnormally high  (L): Data is abnormally low  Assessment/Plan   Problem List Items Addressed This Visit             ICD-10-CM       Endocrine/Metabolic    Vitamin D deficiency E55.9    Relevant Orders    Parathyroid Hormone, Intact    Vitamin D 25-Hydroxy,Total (for eval of Vitamin D levels)    Vitamin B12 deficiency E53.8    Relevant Orders    Vitamin B12    Hyperparathyroidism (CMS/HCC) E21.3    Relevant Orders    Parathyroid Hormone, Intact    Vitamin D 25-Hydroxy,Total (for eval of Vitamin D levels)    History of gastric bypass Z98.84       Gastrointestinal and Abdominal    Intestinal malabsorption - Primary K90.9    Relevant Orders    CBC and Auto Differential    Comprehensive Metabolic Panel    Copper, Blood    Ferritin    Folate    Iron and TIBC    Parathyroid Hormone, Intact    Vitamin B1, Whole Blood    Vitamin B12    Vitamin D 25-Hydroxy,Total (for eval of  Vitamin D levels)    Zinc, Serum or Plasma       We reviewed the rules necessary for long term success after weight loss surgery.  We discussed the need for lifelong nutritional supplementation after weight loss surgery and they were given a handout.  We will obtain labwork at next visit.  We discussed the importance of excercising with moderate intensity a minimum of five days per week for at least 30 minutes.         Rodrigo Brink MD

## 2024-01-11 NOTE — PROGRESS NOTES
Bariatric Post-Op Follow Up Appointment: 3 mos RYGB    Current Weight: 255 lb  Current BMI: 39.94  Percentage of weight loss achieved: 48    Dietary Intake: lifelong rules   Breakfast- a protein shake   Lunch- a salad topped with chicken   Dinner- chicken and vegetables   Volume of food at a time: maybe 1 cup.   Snacks: minimal to none.   Beverages: 50-70 oz of water/day, 1 cup of black coffee/day  Alcohol Intake: couple sips of wine since surgery   Do you drink with your meals? No   Current Exercise: elliptical machine 4x/week for 20-30 minutes   Vitamins/minerals: Celebrate 18mg MVI 1x/day, calcium citrate 2x/day  Food intolerances? None    Any decrease in energy levels? No, increase.   Any questions or concerns? No         Lima Harvye RD, LDN  Registered Dietitian, Licensed Dietitian Nutritionist

## 2024-01-14 LAB
COPPER SERPL-MCNC: 130.5 UG/DL (ref 80–155)
VIT B1 PYROPHOSHATE BLD-SCNC: 126 NMOL/L (ref 70–180)
ZINC SERPL-MCNC: 96.9 UG/DL (ref 60–120)

## 2024-01-22 PROBLEM — Z98.84 HISTORY OF GASTRIC BYPASS: Status: ACTIVE | Noted: 2024-01-22

## 2024-02-02 ENCOUNTER — APPOINTMENT (OUTPATIENT)
Dept: PRIMARY CARE | Facility: CLINIC | Age: 34
End: 2024-02-02
Payer: COMMERCIAL

## 2024-02-24 DIAGNOSIS — F41.0 PANIC ATTACKS: ICD-10-CM

## 2024-02-25 RX ORDER — ESCITALOPRAM OXALATE 20 MG/1
20 TABLET ORAL DAILY
Qty: 30 TABLET | Refills: 0 | Status: SHIPPED | OUTPATIENT
Start: 2024-02-25 | End: 2024-03-16

## 2024-03-16 DIAGNOSIS — F41.0 PANIC ATTACKS: ICD-10-CM

## 2024-03-16 RX ORDER — ESCITALOPRAM OXALATE 20 MG/1
20 TABLET ORAL DAILY
Qty: 30 TABLET | Refills: 1 | Status: SHIPPED | OUTPATIENT
Start: 2024-03-16

## 2024-04-11 ENCOUNTER — APPOINTMENT (OUTPATIENT)
Dept: PRIMARY CARE | Facility: CLINIC | Age: 34
End: 2024-04-11
Payer: COMMERCIAL

## 2024-04-18 ENCOUNTER — APPOINTMENT (OUTPATIENT)
Dept: SURGERY | Facility: CLINIC | Age: 34
End: 2024-04-18
Payer: COMMERCIAL

## 2024-04-23 ENCOUNTER — LAB (OUTPATIENT)
Dept: LAB | Facility: LAB | Age: 34
End: 2024-04-23
Payer: COMMERCIAL

## 2024-04-23 DIAGNOSIS — E55.9 VITAMIN D DEFICIENCY: ICD-10-CM

## 2024-04-23 DIAGNOSIS — E53.8 VITAMIN B12 DEFICIENCY: ICD-10-CM

## 2024-04-23 DIAGNOSIS — K90.9 INTESTINAL MALABSORPTION, UNSPECIFIED TYPE (HHS-HCC): ICD-10-CM

## 2024-04-23 DIAGNOSIS — E21.3 HYPERPARATHYROIDISM (MULTI): ICD-10-CM

## 2024-04-23 LAB
25(OH)D3 SERPL-MCNC: 32 NG/ML (ref 31–100)
ALBUMIN SERPL-MCNC: 4.9 G/DL (ref 3.5–5)
ALP BLD-CCNC: 91 U/L (ref 35–125)
ALT SERPL-CCNC: 21 U/L (ref 5–40)
ANION GAP SERPL CALC-SCNC: 15 MMOL/L
AST SERPL-CCNC: 21 U/L (ref 5–40)
BASOPHILS # BLD AUTO: 0.06 X10*3/UL (ref 0–0.1)
BASOPHILS NFR BLD AUTO: 0.8 %
BILIRUB SERPL-MCNC: 0.3 MG/DL (ref 0.1–1.2)
BUN SERPL-MCNC: 10 MG/DL (ref 8–25)
CALCIUM SERPL-MCNC: 9.7 MG/DL (ref 8.5–10.4)
CHLORIDE SERPL-SCNC: 97 MMOL/L (ref 97–107)
CO2 SERPL-SCNC: 27 MMOL/L (ref 24–31)
CREAT SERPL-MCNC: 0.9 MG/DL (ref 0.4–1.6)
EGFRCR SERPLBLD CKD-EPI 2021: 87 ML/MIN/1.73M*2
EOSINOPHIL # BLD AUTO: 0.12 X10*3/UL (ref 0–0.7)
EOSINOPHIL NFR BLD AUTO: 1.6 %
ERYTHROCYTE [DISTWIDTH] IN BLOOD BY AUTOMATED COUNT: 12.5 % (ref 11.5–14.5)
FERRITIN SERPL-MCNC: 168 NG/ML (ref 13–150)
FOLATE SERPL-MCNC: 18.4 NG/ML (ref 4.2–19.9)
GLUCOSE SERPL-MCNC: 90 MG/DL (ref 65–99)
HCT VFR BLD AUTO: 44.7 % (ref 36–46)
HGB BLD-MCNC: 14.7 G/DL (ref 12–16)
IMM GRANULOCYTES # BLD AUTO: 0.01 X10*3/UL (ref 0–0.7)
IMM GRANULOCYTES NFR BLD AUTO: 0.1 % (ref 0–0.9)
IRON SATN MFR SERPL: 20 % (ref 12–50)
IRON SERPL-MCNC: 60 UG/DL (ref 30–160)
LYMPHOCYTES # BLD AUTO: 3.53 X10*3/UL (ref 1.2–4.8)
LYMPHOCYTES NFR BLD AUTO: 46.3 %
MCH RBC QN AUTO: 31.2 PG (ref 26–34)
MCHC RBC AUTO-ENTMCNC: 32.9 G/DL (ref 32–36)
MCV RBC AUTO: 95 FL (ref 80–100)
MONOCYTES # BLD AUTO: 0.44 X10*3/UL (ref 0.1–1)
MONOCYTES NFR BLD AUTO: 5.8 %
NEUTROPHILS # BLD AUTO: 3.47 X10*3/UL (ref 1.2–7.7)
NEUTROPHILS NFR BLD AUTO: 45.4 %
NRBC BLD-RTO: 0 /100 WBCS (ref 0–0)
PLATELET # BLD AUTO: 305 X10*3/UL (ref 150–450)
POTASSIUM SERPL-SCNC: 4.1 MMOL/L (ref 3.4–5.1)
PROT SERPL-MCNC: 7.8 G/DL (ref 5.9–7.9)
PTH-INTACT SERPL-MCNC: 108.1 PG/ML (ref 18.5–88)
RBC # BLD AUTO: 4.71 X10*6/UL (ref 4–5.2)
SODIUM SERPL-SCNC: 139 MMOL/L (ref 133–145)
TIBC SERPL-MCNC: 306 UG/DL (ref 228–428)
UIBC SERPL-MCNC: 246 UG/DL (ref 110–370)
VIT B12 SERPL-MCNC: 1172 PG/ML (ref 211–946)
WBC # BLD AUTO: 7.6 X10*3/UL (ref 4.4–11.3)

## 2024-04-23 PROCEDURE — 80053 COMPREHEN METABOLIC PANEL: CPT

## 2024-04-23 PROCEDURE — 84630 ASSAY OF ZINC: CPT

## 2024-04-23 PROCEDURE — 83970 ASSAY OF PARATHORMONE: CPT

## 2024-04-23 PROCEDURE — 85025 COMPLETE CBC W/AUTO DIFF WBC: CPT

## 2024-04-23 PROCEDURE — 84425 ASSAY OF VITAMIN B-1: CPT

## 2024-04-23 PROCEDURE — 82607 VITAMIN B-12: CPT

## 2024-04-23 PROCEDURE — 36415 COLL VENOUS BLD VENIPUNCTURE: CPT

## 2024-04-23 PROCEDURE — 82306 VITAMIN D 25 HYDROXY: CPT

## 2024-04-23 PROCEDURE — 83550 IRON BINDING TEST: CPT

## 2024-04-23 PROCEDURE — 82728 ASSAY OF FERRITIN: CPT

## 2024-04-23 PROCEDURE — 83540 ASSAY OF IRON: CPT

## 2024-04-23 PROCEDURE — 82525 ASSAY OF COPPER: CPT

## 2024-04-23 PROCEDURE — 82746 ASSAY OF FOLIC ACID SERUM: CPT

## 2024-04-24 NOTE — PROGRESS NOTES
Subjective   Patient ID: Alethea Gannon is a 34 y.o. female who presents for No chief complaint on file..  HPI  6 MOS FUV RYGB  START WT: 342   IDEAL WT: 160  START EXCESS:182  TOTAL WT LOSS:   118 EWL:   65  % HT:  67   IN.   LABS ARE DONE IN EPIC   Review of Systems  Bariatric Surgery F/U:          Seen at ER after surgery? No.  Hospital admission? No.  Bariatric reoperation? No.  Bariatric intervention? No.  Was anticoagulation initiated for presumed/confirmed Vein Thrombosis/PE? No.  Was an incisional hernia noted on exam? No.  Sleep Apnea? YES.  Still using C-PAP?YES.  GERD req. meds? YES  Hyperlipidemia? No.  Still taking Cholesterol med? No.  Hypertension? No.  Still taking HTN med? No.  Number of Anti-hypertensive Medications: 0.  Diabetes? No.  Still taking DM med? No.  Current DM medication type: _____.         CONSTITUTIONAL:          Chills No.  Fatigue No.  Fever No.         CARDIOLOGY:          Negative for dizziness, chest pain, palpitations, shortness of breath.         RESPIRATORY:          Negative for chest congestion, cough, wheezing.         GASTROENTEROLOGY:          Food Intolerance No.  Acid reflux No.  Abdominal pain No.  Black stools No.  Constipation No.  Diarrhea No.  Loss of appetite no.  Nausea No.  Vomiting No.         UROLOGY:          Negative for dysuria, urinary urgency, kidney stones.         PSYCHOLOGY:          Negative for depression, ADMITS TO  anxiety, high stress level.   Objective   Physical Exam    Assessment/Plan            Eneida Addison LPN 04/24/24 4:16 PM

## 2024-04-25 ENCOUNTER — OFFICE VISIT (OUTPATIENT)
Dept: SURGERY | Facility: CLINIC | Age: 34
End: 2024-04-25
Payer: COMMERCIAL

## 2024-04-25 ENCOUNTER — NUTRITION (OUTPATIENT)
Dept: SURGERY | Facility: CLINIC | Age: 34
End: 2024-04-25

## 2024-04-25 VITALS
HEIGHT: 67 IN | BODY MASS INDEX: 35.16 KG/M2 | WEIGHT: 224 LBS | HEART RATE: 98 BPM | DIASTOLIC BLOOD PRESSURE: 91 MMHG | SYSTOLIC BLOOD PRESSURE: 156 MMHG

## 2024-04-25 DIAGNOSIS — E21.3 HYPERPARATHYROIDISM (MULTI): ICD-10-CM

## 2024-04-25 DIAGNOSIS — E66.01 MORBID OBESITY (MULTI): ICD-10-CM

## 2024-04-25 DIAGNOSIS — K21.9 GASTROESOPHAGEAL REFLUX DISEASE, UNSPECIFIED WHETHER ESOPHAGITIS PRESENT: Primary | ICD-10-CM

## 2024-04-25 DIAGNOSIS — K90.9 INTESTINAL MALABSORPTION, UNSPECIFIED TYPE (HHS-HCC): ICD-10-CM

## 2024-04-25 DIAGNOSIS — E55.9 VITAMIN D DEFICIENCY: ICD-10-CM

## 2024-04-25 DIAGNOSIS — E53.8 VITAMIN B12 DEFICIENCY: ICD-10-CM

## 2024-04-25 LAB
COPPER SERPL-MCNC: 126.1 UG/DL (ref 80–155)
ZINC SERPL-MCNC: 80.5 UG/DL (ref 60–120)

## 2024-04-25 PROCEDURE — 3008F BODY MASS INDEX DOCD: CPT

## 2024-04-25 PROCEDURE — 99214 OFFICE O/P EST MOD 30 MIN: CPT

## 2024-04-25 PROCEDURE — 3080F DIAST BP >= 90 MM HG: CPT

## 2024-04-25 PROCEDURE — 3077F SYST BP >= 140 MM HG: CPT

## 2024-04-25 RX ORDER — OMEPRAZOLE 40 MG/1
40 CAPSULE, DELAYED RELEASE ORAL DAILY
Qty: 90 CAPSULE | Refills: 2 | Status: SHIPPED | OUTPATIENT
Start: 2024-04-25 | End: 2025-01-20

## 2024-04-25 ASSESSMENT — COLUMBIA-SUICIDE SEVERITY RATING SCALE - C-SSRS
1. IN THE PAST MONTH, HAVE YOU WISHED YOU WERE DEAD OR WISHED YOU COULD GO TO SLEEP AND NOT WAKE UP?: NO
6. HAVE YOU EVER DONE ANYTHING, STARTED TO DO ANYTHING, OR PREPARED TO DO ANYTHING TO END YOUR LIFE?: NO
2. HAVE YOU ACTUALLY HAD ANY THOUGHTS OF KILLING YOURSELF?: NO

## 2024-04-25 ASSESSMENT — PATIENT HEALTH QUESTIONNAIRE - PHQ9
1. LITTLE INTEREST OR PLEASURE IN DOING THINGS: NOT AT ALL
2. FEELING DOWN, DEPRESSED OR HOPELESS: NOT AT ALL
SUM OF ALL RESPONSES TO PHQ9 QUESTIONS 1 AND 2: 0

## 2024-04-25 ASSESSMENT — PAIN SCALES - GENERAL: PAINLEVEL: 0-NO PAIN

## 2024-04-25 NOTE — PROGRESS NOTES
Bariatric Post-Op Follow Up Appointment: 6 mos RYGB     Current Weight: 224 lb  Current BMI: 35.08  Percentage of weight loss achieved: 65    Dietary Intake: the lifelong rules   Breakfast- a ONE protein bar   Lunch- salad with grilled chicken   Dinner- chicken and a vegetable   Volume of food at a time: 3/4 cups   Snacks: cheese and pepperoni   Beverages: mainly water   Alcohol Intake: 2 drinks since surgery   Do you drink with your meals? No   Current Exercise: riding the bike and walking for 20-30 minutes, 4-5x/week.   Vitamins/minerals: [id number] MVI 1x/day, calcium citrate chews 2x/day. Pt was instructed to increase calcium citrate to 3x/day.   Food intolerances? Vomited after consuming chinese food 1x   Any decrease in energy levels? No, pt notes she has had an increase.    Any questions or concerns? Addressed pt's dietary questions.           Lima Harvey RD, LDN  Registered Dietitian, Licensed Dietitian Nutritionist

## 2024-04-25 NOTE — PROGRESS NOTES
Subjective   Patient ID: Alethea Gannon is a 34 y.o. female who presents for Follow-up (6 MOS FUV RYGB).  MILES Claire is here for her 6 month follow up after a RYGB. She has lost 65% of her excess weight. She denies reflux. She recently had an episode that needed amoxicillin. She tells me that a few days after, she had trouble swallowing. She started 20mg omeprazole. She says that this is helping. She has hunger. It takes about 1/2-1c to feel full. She is excercising 4 days per week for 20-30 minutes. She is currently taking celebrate 18 and calcium citrate BID. She had labwork for this appointment, which we reviewed.       Objective   Physical Exam  Constitutional:       Appearance: Normal appearance.   Eyes:      Pupils: Pupils are equal, round, and reactive to light.   Cardiovascular:      Rate and Rhythm: Normal rate.   Pulmonary:      Effort: Pulmonary effort is normal.   Abdominal:      Palpations: Abdomen is soft.   Musculoskeletal:         General: Normal range of motion.   Skin:     General: Skin is warm and dry.   Neurological:      General: No focal deficit present.      Mental Status: She is alert and oriented to person, place, and time.   Psychiatric:         Mood and Affect: Mood normal.        Latest Reference Range & Units 04/23/24 16:43   GLUCOSE 65 - 99 mg/dL 90   SODIUM 133 - 145 mmol/L 139   POTASSIUM 3.4 - 5.1 mmol/L 4.1   CHLORIDE 97 - 107 mmol/L 97   Bicarbonate 24 - 31 mmol/L 27   Anion Gap <=19 mmol/L 15   Blood Urea Nitrogen 8 - 25 mg/dL 10   Creatinine 0.40 - 1.60 mg/dL 0.90   EGFR >60 mL/min/1.73m*2 87   Calcium 8.5 - 10.4 mg/dL 9.7   Albumin 3.5 - 5.0 g/dL 4.9   Alkaline Phosphatase 35 - 125 U/L 91   ALT 5 - 40 U/L 21   AST 5 - 40 U/L 21   Bilirubin Total 0.1 - 1.2 mg/dL 0.3   FERRITIN 13 - 150 ng/mL 168 (H)   FOLATE 4.2 - 19.9 ng/mL 18.4   Total Protein 5.9 - 7.9 g/dL 7.8   IRON 30 - 160 ug/dL 60   TIBC 228 - 428 ug/dL 306   UIBC 110 - 370 ug/dL 246   % Saturation 12 - 50 % 20    Vitamin B12 211 - 946 pg/mL 1,172 (H)   Copper 80.0 - 155.0 ug/dL 126.1   Zinc, Serum or Plasma 60.0 - 120.0 ug/dL 80.5   Parathyroid Hormone, Intact 18.5 - 88.0 pg/mL 108.1 (H)   Vitamin D, 25-Hydroxy, Total 31 - 100 ng/mL 32   LEUKOCYTES (10*3/UL) IN BLOOD BY AUTOMATED COUNT, [facility] 4.4 - 11.3 x10*3/uL 7.6   nRBC 0.0 - 0.0 /100 WBCs 0.0   ERYTHROCYTES (10*6/UL) IN BLOOD BY AUTOMATED COUNT, [facility] 4.00 - 5.20 x10*6/uL 4.71   HEMOGLOBIN 12.0 - 16.0 g/dL 14.7   HEMATOCRIT 36.0 - 46.0 % 44.7   MCV 80 - 100 fL 95   MCH 26.0 - 34.0 pg 31.2   MCHC 32.0 - 36.0 g/dL 32.9   RED CELL DISTRIBUTION WIDTH 11.5 - 14.5 % 12.5   PLATELETS (10*3/UL) IN BLOOD AUTOMATED COUNT, [facility] 150 - 450 x10*3/uL 305   NEUTROPHILS/100 LEUKOCYTES IN BLOOD BY AUTOMATED COUNT, [employer] 40.0 - 80.0 % 45.4   Immature Granulocytes %, Automated 0.0 - 0.9 % 0.1   Lymphocytes % 13.0 - 44.0 % 46.3   Monocytes % 2.0 - 10.0 % 5.8   Eosinophils % 0.0 - 6.0 % 1.6   Basophils % 0.0 - 2.0 % 0.8   NEUTROPHILS (10*3/UL) IN BLOOD BY AUTOMATED COUNT, [facility] 1.20 - 7.70 x10*3/uL 3.47   Immature Granulocytes Absolute, Automated 0.00 - 0.70 x10*3/uL 0.01   Lymphocytes Absolute 1.20 - 4.80 x10*3/uL 3.53   Monocytes Absolute 0.10 - 1.00 x10*3/uL 0.44   Eosinophils Absolute 0.00 - 0.70 x10*3/uL 0.12   Basophils Absolute 0.00 - 0.10 x10*3/uL 0.06     Assessment/Plan   Problem List Items Addressed This Visit             ICD-10-CM    Morbid obesity (Multi) E66.01    Gastroesophageal reflux disease - Primary K21.9    Relevant Medications    omeprazole (PriLOSEC) 40 mg DR capsule    Vitamin D deficiency E55.9    Relevant Orders    Vitamin D 25-Hydroxy,Total (for eval of Vitamin D levels)    Vitamin B12 deficiency E53.8    Relevant Orders    Vitamin B12    Intestinal malabsorption ([facility]) K90.9    Relevant Orders    Comprehensive Metabolic Panel    CBC and Auto Differential    Copper, Blood    Ferritin    Folate    Iron and TIBC    Parathyroid Hormone, Intact     Vitamin B1, Whole Blood    Vitamin B12    Vitamin D 25-Hydroxy,Total (for eval of Vitamin D levels)    Zinc, Serum or Plasma    Hyperparathyroidism (Multi) E21.3    Relevant Orders    Parathyroid Hormone, Intact   I encouraged Alethea to continue to follow the rules for continued weight loss and weight maintenance. We discussed trying 40mg of omeprazole until her next appointment as the ABX may have irritated her esophagus. She will also increase her calcium to TID. We will recheck her bloodwork in 6 months.          TEENA Delarosa-CNP 04/25/24 3:15 PM

## 2024-04-28 LAB — VIT B1 PYROPHOSHATE BLD-SCNC: 137 NMOL/L (ref 70–180)

## 2024-09-13 DIAGNOSIS — K90.9 INTESTINAL MALABSORPTION, UNSPECIFIED TYPE (HHS-HCC): ICD-10-CM

## 2024-09-13 NOTE — PROGRESS NOTES
Subjective   Patient ID: Alethea Gannon is a 34 y.o. female who presents for No chief complaint on file..  HPI  1 YR FUV RYGB  START WT: 342 IDEAL WT: 160 START EXCESS:   182 TWL:   EWL:     %  HT: 67 IN  LAB RESULTS IN EPIC    Review of Systems  Bariatric Surgery F/U:          Seen at ER after surgery? No.  Hospital admission? No.  Bariatric reoperation? No.  Bariatric intervention? No.  Was anticoagulation initiated for presumed/confirmed Vein Thrombosis/PE? No.  Was an incisional hernia noted on exam? No.  Sleep Apnea? YES.  Still using C-PAP?YES.  GERD req. meds? YES  Hyperlipidemia? No.  Still taking Cholesterol med? No.  Hypertension? No.  Still taking HTN med? No.  Number of Anti-hypertensive Medications: 0.  Diabetes? No.  Still taking DM med? No.  Current DM medication type: _____.         CONSTITUTIONAL:          Chills No.  Fatigue No.  Fever No.         CARDIOLOGY:          Negative for dizziness, chest pain, palpitations, shortness of breath.         RESPIRATORY:          Negative for chest congestion, cough, wheezing.         GASTROENTEROLOGY:          Food Intolerance No.  Acid reflux No.  Abdominal pain No.  Black stools No.  Constipation No.  Diarrhea No.  Loss of appetite no.  Nausea No.  Vomiting No.         UROLOGY:          Negative for dysuria, urinary urgency, kidney stones.         PSYCHOLOGY:          admits to depression, ADMITS TO  anxiety, high stress level.   Objective   Physical Exam    Assessment/Plan            Eneida Addison LPN 09/13/24 8:36 AM

## 2024-09-18 ENCOUNTER — LAB (OUTPATIENT)
Dept: LAB | Facility: LAB | Age: 34
End: 2024-09-18
Payer: COMMERCIAL

## 2024-09-18 DIAGNOSIS — K90.9 INTESTINAL MALABSORPTION, UNSPECIFIED TYPE (HHS-HCC): ICD-10-CM

## 2024-09-18 LAB
25(OH)D3 SERPL-MCNC: 29 NG/ML (ref 30–100)
ALBUMIN SERPL BCP-MCNC: 4.5 G/DL (ref 3.4–5)
ALP SERPL-CCNC: 62 U/L (ref 33–110)
ALT SERPL W P-5'-P-CCNC: 16 U/L (ref 7–45)
ANION GAP SERPL CALCULATED.3IONS-SCNC: 11 MMOL/L (ref 10–20)
AST SERPL W P-5'-P-CCNC: 19 U/L (ref 9–39)
BASOPHILS # BLD AUTO: 0.06 X10*3/UL (ref 0–0.1)
BASOPHILS NFR BLD AUTO: 1 %
BILIRUB SERPL-MCNC: 0.4 MG/DL (ref 0–1.2)
BUN SERPL-MCNC: 16 MG/DL (ref 6–23)
CALCIUM SERPL-MCNC: 8.9 MG/DL (ref 8.6–10.3)
CHLORIDE SERPL-SCNC: 101 MMOL/L (ref 98–107)
CO2 SERPL-SCNC: 31 MMOL/L (ref 21–32)
CREAT SERPL-MCNC: 0.81 MG/DL (ref 0.5–1.05)
EGFRCR SERPLBLD CKD-EPI 2021: >90 ML/MIN/1.73M*2
EOSINOPHIL # BLD AUTO: 0.13 X10*3/UL (ref 0–0.7)
EOSINOPHIL NFR BLD AUTO: 2.2 %
ERYTHROCYTE [DISTWIDTH] IN BLOOD BY AUTOMATED COUNT: 12.2 % (ref 11.5–14.5)
FERRITIN SERPL-MCNC: 75 NG/ML (ref 8–150)
GLUCOSE SERPL-MCNC: 108 MG/DL (ref 74–99)
HCT VFR BLD AUTO: 40 % (ref 36–46)
HGB BLD-MCNC: 13.2 G/DL (ref 12–16)
IMM GRANULOCYTES # BLD AUTO: 0 X10*3/UL (ref 0–0.7)
IMM GRANULOCYTES NFR BLD AUTO: 0 % (ref 0–0.9)
IRON SATN MFR SERPL: 18 % (ref 25–45)
IRON SERPL-MCNC: 59 UG/DL (ref 35–150)
LYMPHOCYTES # BLD AUTO: 3.01 X10*3/UL (ref 1.2–4.8)
LYMPHOCYTES NFR BLD AUTO: 51.8 %
MCH RBC QN AUTO: 32 PG (ref 26–34)
MCHC RBC AUTO-ENTMCNC: 33 G/DL (ref 32–36)
MCV RBC AUTO: 97 FL (ref 80–100)
MONOCYTES # BLD AUTO: 0.31 X10*3/UL (ref 0.1–1)
MONOCYTES NFR BLD AUTO: 5.3 %
NEUTROPHILS # BLD AUTO: 2.3 X10*3/UL (ref 1.2–7.7)
NEUTROPHILS NFR BLD AUTO: 39.7 %
NRBC BLD-RTO: 0 /100 WBCS (ref 0–0)
PLATELET # BLD AUTO: 215 X10*3/UL (ref 150–450)
POTASSIUM SERPL-SCNC: 3.6 MMOL/L (ref 3.5–5.3)
PROT SERPL-MCNC: 6.7 G/DL (ref 6.4–8.2)
PTH-INTACT SERPL-MCNC: 45.2 PG/ML (ref 18.5–88)
RBC # BLD AUTO: 4.13 X10*6/UL (ref 4–5.2)
SODIUM SERPL-SCNC: 139 MMOL/L (ref 136–145)
TIBC SERPL-MCNC: 333 UG/DL (ref 240–445)
UIBC SERPL-MCNC: 274 UG/DL (ref 110–370)
VIT B12 SERPL-MCNC: 1463 PG/ML (ref 211–911)
WBC # BLD AUTO: 5.8 X10*3/UL (ref 4.4–11.3)

## 2024-09-18 PROCEDURE — 85025 COMPLETE CBC W/AUTO DIFF WBC: CPT

## 2024-09-18 PROCEDURE — 82525 ASSAY OF COPPER: CPT

## 2024-09-18 PROCEDURE — 84425 ASSAY OF VITAMIN B-1: CPT

## 2024-09-18 PROCEDURE — 84630 ASSAY OF ZINC: CPT

## 2024-09-18 PROCEDURE — 36415 COLL VENOUS BLD VENIPUNCTURE: CPT

## 2024-09-18 PROCEDURE — 83970 ASSAY OF PARATHORMONE: CPT

## 2024-09-18 PROCEDURE — 82306 VITAMIN D 25 HYDROXY: CPT

## 2024-09-18 PROCEDURE — 82746 ASSAY OF FOLIC ACID SERUM: CPT

## 2024-09-18 PROCEDURE — 82728 ASSAY OF FERRITIN: CPT

## 2024-09-18 PROCEDURE — 82607 VITAMIN B-12: CPT

## 2024-09-18 PROCEDURE — 83550 IRON BINDING TEST: CPT

## 2024-09-18 PROCEDURE — 83540 ASSAY OF IRON: CPT

## 2024-09-18 PROCEDURE — 80053 COMPREHEN METABOLIC PANEL: CPT

## 2024-09-19 LAB — FOLATE SERPL-MCNC: 15.5 NG/ML

## 2024-09-21 LAB
COPPER SERPL-MCNC: 95.4 UG/DL (ref 80–155)
ZINC SERPL-MCNC: 60.3 UG/DL (ref 60–120)

## 2024-09-23 LAB — VIT B1 PYROPHOSHATE BLD-SCNC: 115 NMOL/L (ref 70–180)

## 2024-09-26 ENCOUNTER — APPOINTMENT (OUTPATIENT)
Dept: SURGERY | Facility: CLINIC | Age: 34
End: 2024-09-26
Payer: COMMERCIAL

## 2024-09-26 VITALS
BODY MASS INDEX: 32.65 KG/M2 | WEIGHT: 208 LBS | DIASTOLIC BLOOD PRESSURE: 105 MMHG | HEART RATE: 81 BPM | SYSTOLIC BLOOD PRESSURE: 160 MMHG | HEIGHT: 67 IN

## 2024-09-26 DIAGNOSIS — E55.9 VITAMIN D DEFICIENCY: ICD-10-CM

## 2024-09-26 DIAGNOSIS — E61.1 IRON DEFICIENCY: ICD-10-CM

## 2024-09-26 DIAGNOSIS — Z98.84 HISTORY OF GASTRIC BYPASS: ICD-10-CM

## 2024-09-26 DIAGNOSIS — E53.8 VITAMIN B12 DEFICIENCY: ICD-10-CM

## 2024-09-26 DIAGNOSIS — K90.9 INTESTINAL MALABSORPTION, UNSPECIFIED TYPE (HHS-HCC): Primary | ICD-10-CM

## 2024-09-26 DIAGNOSIS — E21.3 HYPERPARATHYROIDISM (MULTI): ICD-10-CM

## 2024-09-26 PROCEDURE — 3080F DIAST BP >= 90 MM HG: CPT

## 2024-09-26 PROCEDURE — 3008F BODY MASS INDEX DOCD: CPT

## 2024-09-26 PROCEDURE — 99214 OFFICE O/P EST MOD 30 MIN: CPT

## 2024-09-26 PROCEDURE — 3077F SYST BP >= 140 MM HG: CPT

## 2024-09-26 RX ORDER — VIT C/E/ZN/COPPR/LUTEIN/ZEAXAN 250MG-90MG
25 CAPSULE ORAL DAILY
Qty: 90 CAPSULE | Refills: 3 | Status: SHIPPED | OUTPATIENT
Start: 2024-09-26 | End: 2025-09-26

## 2024-09-26 ASSESSMENT — PAIN SCALES - GENERAL: PAINLEVEL: 0-NO PAIN

## 2024-09-26 ASSESSMENT — COLUMBIA-SUICIDE SEVERITY RATING SCALE - C-SSRS
2. HAVE YOU ACTUALLY HAD ANY THOUGHTS OF KILLING YOURSELF?: NO
6. HAVE YOU EVER DONE ANYTHING, STARTED TO DO ANYTHING, OR PREPARED TO DO ANYTHING TO END YOUR LIFE?: NO
1. IN THE PAST MONTH, HAVE YOU WISHED YOU WERE DEAD OR WISHED YOU COULD GO TO SLEEP AND NOT WAKE UP?: NO

## 2024-09-26 NOTE — PROGRESS NOTES
Subjective   Patient ID: Alethea Gannon is a 34 y.o. female who presents for No chief complaint on file..  MILES Claire is here for her 1 year follow up after a RYGB. She has lost 74% of her excess weight. She denies reflux. She stopped taking her omeprazole and has not had any problems. She has hunger. It takes about 1c to feel full. She is not snacking. She is walking 5 days per week for 30 minutes. She is currently taking celebrate 18 and calcium citrate BID. She had labwork for this appointment, which we reviewed.       Objective   Physical Exam  Constitutional:       Appearance: Normal appearance.   Eyes:      Pupils: Pupils are equal, round, and reactive to light.   Cardiovascular:      Rate and Rhythm: Normal rate.   Pulmonary:      Effort: Pulmonary effort is normal.   Abdominal:      Palpations: Abdomen is soft.   Musculoskeletal:         General: Normal range of motion.   Skin:     General: Skin is warm and dry.   Neurological:      General: No focal deficit present.      Mental Status: She is alert and oriented to person, place, and time.   Psychiatric:         Mood and Affect: Mood normal.        Latest Reference Range & Units 09/18/24 17:50   GLUCOSE 74 - 99 mg/dL 108 (H)   SODIUM 136 - 145 mmol/L 139   POTASSIUM 3.5 - 5.3 mmol/L 3.6   CHLORIDE 98 - 107 mmol/L 101   Bicarbonate 21 - 32 mmol/L 31   Anion Gap 10 - 20 mmol/L 11   Blood Urea Nitrogen 6 - 23 mg/dL 16   Creatinine 0.50 - 1.05 mg/dL 0.81   EGFR >60 mL/min/1.73m*2 >90   Calcium 8.6 - 10.3 mg/dL 8.9   Albumin 3.4 - 5.0 g/dL 4.5   Alkaline Phosphatase 33 - 110 U/L 62   ALT 7 - 45 U/L 16   AST 9 - 39 U/L 19   Bilirubin Total 0.0 - 1.2 mg/dL 0.4   FERRITIN 8 - 150 ng/mL 75   FOLATE >5.0 ng/mL 15.5   Total Protein 6.4 - 8.2 g/dL 6.7   IRON 35 - 150 ug/dL 59   TIBC 240 - 445 ug/dL 333   UIBC 110 - 370 ug/dL 274   % Saturation 25 - 45 % 18 (L)   Vitamin B12 211 - 911 pg/mL 1,463 (H)   Copper 80.0 - 155.0 ug/dL 95.4   Zinc, Serum or Plasma 60.0 -  120.0 ug/dL 60.3   Vitamin B1, Whole Blood 70 - 180 nmol/L 115   Parathyroid Hormone, Intact 18.5 - 88.0 pg/mL 45.2   Vitamin D, 25-Hydroxy, Total 30 - 100 ng/mL 29 (L)   WBC 4.4 - 11.3 x10*3/uL 5.8   nRBC 0.0 - 0.0 /100 WBCs 0.0   RBC 4.00 - 5.20 x10*6/uL 4.13   HEMOGLOBIN 12.0 - 16.0 g/dL 13.2   HEMATOCRIT 36.0 - 46.0 % 40.0   MCV 80 - 100 fL 97   MCH 26.0 - 34.0 pg 32.0   MCHC 32.0 - 36.0 g/dL 33.0   RED CELL DISTRIBUTION WIDTH 11.5 - 14.5 % 12.2   Platelets 150 - 450 x10*3/uL 215   Neutrophils % 40.0 - 80.0 % 39.7   Immature Granulocytes %, Automated 0.0 - 0.9 % 0.0   Lymphocytes % 13.0 - 44.0 % 51.8   Monocytes % 2.0 - 10.0 % 5.3   Eosinophils % 0.0 - 6.0 % 2.2   Basophils % 0.0 - 2.0 % 1.0   Neutrophils Absolute 1.20 - 7.70 x10*3/uL 2.30   Immature Granulocytes Absolute, Automated 0.00 - 0.70 x10*3/uL 0.00   Lymphocytes Absolute 1.20 - 4.80 x10*3/uL 3.01   Monocytes Absolute 0.10 - 1.00 x10*3/uL 0.31   Eosinophils Absolute 0.00 - 0.70 x10*3/uL 0.13   Basophils Absolute 0.00 - 0.10 x10*3/uL 0.06     Assessment/Plan   Problem List Items Addressed This Visit             ICD-10-CM    Vitamin D deficiency E55.9    Relevant Medications    cholecalciferol (Vitamin D-3) 25 MCG (1000 UT) capsule    Other Relevant Orders    Vitamin D 25-Hydroxy,Total (for eval of Vitamin D levels)    Vitamin B12 deficiency E53.8    Relevant Orders    Vitamin B12    Intestinal malabsorption ([facility]) - Primary K90.9    Relevant Orders    CBC and Auto Differential    Comprehensive Metabolic Panel    Copper, Blood    Ferritin    Folate    Iron and TIBC    Parathyroid Hormone, Intact    Vitamin B1, Whole Blood    Vitamin B12    Vitamin D 25-Hydroxy,Total (for eval of Vitamin D levels)    Zinc, Serum or Plasma    Hyperparathyroidism (Multi) E21.3    Relevant Orders    Parathyroid Hormone, Intact    History of gastric bypass Z98.84    Iron deficiency E61.1    Relevant Orders    Iron and TIBC     Alethea is doing well with her weight loss. We  discussed add resistance or weight training to her exercise routine for increased weight loss. She will also add a vitamin D supplement and increase her MVI to Celebrate 45. We will recheck her labwork at her next follow up appointment in 6 months.     TEENA Delarosa-CNP 09/26/24 3:03 PM

## 2024-09-27 PROBLEM — E61.1 IRON DEFICIENCY: Status: ACTIVE | Noted: 2024-09-27

## 2025-03-12 ENCOUNTER — TELEPHONE (OUTPATIENT)
Dept: SURGERY | Facility: CLINIC | Age: 35
End: 2025-03-12
Payer: COMMERCIAL

## 2025-03-24 ENCOUNTER — APPOINTMENT (OUTPATIENT)
Dept: SURGERY | Facility: CLINIC | Age: 35
End: 2025-03-24
Payer: COMMERCIAL

## 2025-03-25 ENCOUNTER — APPOINTMENT (OUTPATIENT)
Dept: SURGERY | Facility: CLINIC | Age: 35
End: 2025-03-25
Payer: COMMERCIAL

## 2025-04-04 ENCOUNTER — APPOINTMENT (OUTPATIENT)
Dept: SURGERY | Facility: CLINIC | Age: 35
End: 2025-04-04
Payer: COMMERCIAL